# Patient Record
Sex: FEMALE | Race: ASIAN | URBAN - METROPOLITAN AREA
[De-identification: names, ages, dates, MRNs, and addresses within clinical notes are randomized per-mention and may not be internally consistent; named-entity substitution may affect disease eponyms.]

---

## 2023-06-01 ENCOUNTER — OFFICE VISIT (OUTPATIENT)
Dept: URBAN - METROPOLITAN AREA CLINIC 46 | Facility: CLINIC | Age: 63
End: 2023-06-01
Payer: COMMERCIAL

## 2023-06-01 DIAGNOSIS — H16.223 KERATOCONJUNCTIVITIS SICCA, BILATERAL: ICD-10-CM

## 2023-06-01 DIAGNOSIS — H25.13 AGE-RELATED NUCLEAR CATARACT, BILATERAL: Primary | ICD-10-CM

## 2023-06-01 PROCEDURE — 99214 OFFICE O/P EST MOD 30 MIN: CPT | Performed by: OPHTHALMOLOGY

## 2023-06-01 ASSESSMENT — INTRAOCULAR PRESSURE
OS: 12
OD: 12

## 2023-06-01 ASSESSMENT — KERATOMETRY
OS: 36.75
OD: 37.14

## 2023-06-01 NOTE — IMPRESSION/PLAN
Impression: Keratoconjunctivitis sicca, bilateral: S43.160. Plan: Patient advised that dry eyes may be the cause of symptoms. Advised to use artificial tears as needed.

## 2023-06-01 NOTE — IMPRESSION/PLAN
Impression: Age-related nuclear cataract, bilateral: H25.13. Plan: Advised patient on beginnings of cataract, they are very mild. No treatment is needed or recommended at this time. Patient may have many years to come until cataract removal surgery is recommended.